# Patient Record
Sex: MALE | Race: WHITE | Employment: UNEMPLOYED | URBAN - METROPOLITAN AREA
[De-identification: names, ages, dates, MRNs, and addresses within clinical notes are randomized per-mention and may not be internally consistent; named-entity substitution may affect disease eponyms.]

---
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
## 2022-01-09 ENCOUNTER — APPOINTMENT (OUTPATIENT)
Dept: GENERAL RADIOLOGY | Age: 22
End: 2022-01-09
Payer: COMMERCIAL

## 2022-01-09 ENCOUNTER — HOSPITAL ENCOUNTER (EMERGENCY)
Age: 22
Discharge: HOME | End: 2022-01-09
Payer: COMMERCIAL

## 2022-01-09 VITALS
HEART RATE: 85 BPM | OXYGEN SATURATION: 98 % | SYSTOLIC BLOOD PRESSURE: 133 MMHG | RESPIRATION RATE: 14 BRPM | DIASTOLIC BLOOD PRESSURE: 76 MMHG | TEMPERATURE: 98.8 F

## 2022-01-09 DIAGNOSIS — Z71.89 EDUCATED ABOUT COVID-19 VIRUS INFECTION: ICD-10-CM

## 2022-01-09 DIAGNOSIS — Z20.822 EXPOSURE TO COVID-19 VIRUS: Primary | ICD-10-CM

## 2022-01-09 DIAGNOSIS — R05.9 COUGH: ICD-10-CM

## 2022-01-09 PROCEDURE — 71101 X-RAY EXAM UNILAT RIBS/CHEST: CPT

## 2022-01-09 PROCEDURE — 99284 EMERGENCY DEPT VISIT MOD MDM: CPT

## 2022-01-09 PROCEDURE — U0005 INFEC AGEN DETEC AMPLI PROBE: HCPCS

## 2022-01-09 PROCEDURE — U0003 INFECTIOUS AGENT DETECTION BY NUCLEIC ACID (DNA OR RNA); SEVERE ACUTE RESPIRATORY SYNDROME CORONAVIRUS 2 (SARS-COV-2) (CORONAVIRUS DISEASE [COVID-19]), AMPLIFIED PROBE TECHNIQUE, MAKING USE OF HIGH THROUGHPUT TECHNOLOGIES AS DESCRIBED BY CMS-2020-01-R: HCPCS

## 2022-01-09 RX ORDER — BENZONATATE 200 MG/1
200 CAPSULE ORAL 3 TIMES DAILY PRN
Qty: 30 CAPSULE | Refills: 0 | Status: SHIPPED | OUTPATIENT
Start: 2022-01-09 | End: 2022-01-19

## 2022-01-09 ASSESSMENT — PAIN DESCRIPTION - PAIN TYPE: TYPE: ACUTE PAIN

## 2022-01-09 ASSESSMENT — PAIN SCALES - GENERAL: PAINLEVEL_OUTOF10: 7

## 2022-01-09 NOTE — ED PROVIDER NOTES
[address] [place] [address]  Department of Emergency Medicine   ED  Encounter Note  Admit Date/RoomTime: 2022  3:26 PM  ED Room:     NAME: Dwayne Kwon  : 2000  MRN: [id number]     Chief Complaint:  Cough (covid sx, right lateral rib pain, pleuritic, exposed to covid at home ) and Rib Pain    History of Present Illness       Dwayne Kwon is a 25 y.o. old male who presents to the emergency department by private vehicle, for cough, which began 2 day(s) prior to arrival.  Since onset the symptoms have been persistent and mild in severity. The symptoms are associated with no additional symptoms as it relates to today's visit. Patient states from coughing so hard his right lateral rib hurts more so with coughing and palpation. Patient denies any falls or trauma. There has been no abdominal pain, decreased appetite, chest tightness, conjunctivitis, watery eyes, productive cough, nausea, vomiting, diarrhea, dizziness, dysuria, urinary frequency, earache, ear pulling, fever, fatigue, headache, hoarseness, irritability, joint swelling, malaise, muscle aches, nasal congestion, runny nose, neck stiffness, rash, sneezing, sore throat, scratchy throat, swollen glands, wheezing, loss of taste or loss of smell. The patient has not received any COVID-19 vaccine    ROS   Pertinent positives and negatives are stated within HPI, all other systems reviewed and are negative. Past Medical History:  has no past medical history on file. Surgical History:  has no past surgical history on file. Social History:      Family History: family history is not on file. Allergies: Patient has no known allergies. Physical Exam   Oxygen Saturation Interpretation: Normal on room air analysis.         ED Triage Vitals [22 1526]   BP Temp Temp Source Pulse Resp SpO2 Height Weight   133/76 99 °F (37.2 °C) Temporal 98 14 98 % -- --         · Constitutional:  Alert, development consistent with age.  · Ears:  External Ears: Right normal.               TM's & External Canals: normal TM's and external ear canals bilaterally. · Nose:   There is no discharge, swelling or lesions noted. · Sinuses: no Bilateral maxillary sinus tenderness. no Bilateral frontal sinus tenderness. · Mouth:  normal tongue and buccal mucosa. · Throat: no erythema or exudates noted. Teeth and gums normal..  Airway Patent. · Neck:  Supple. No meningeal signs. There is no  preauricular, submental, parotid, anterior cervical and posterior cervical node tenderness. · Respiratory:   Breath sounds: Bilateral normal.  Lung sounds: normal.  Chest wall was palpated and reproducible right lateral chest wall pain specifically over ribs 9 and 10 and 11. No evidence of erythema, edema, ecchymosis or trauma  · CV:  Regular rate and rhythm, normal heart sounds, without pathological murmurs, ectopy, gallops, or rubs. · GI:  Abdomen Soft, nontender, good bowel sounds. No firm or pulsatile mass. · Integument:  Normal turgor. Warm, dry, without visible rash. · Neurological:  Oriented. Motor functions intact. Lab / Imaging Results   (All laboratory and radiology results have been personally reviewed by myself)  Labs:  No results found for this visit on 01/09/22. Imaging: All Radiology results interpreted by Radiologist unless otherwise noted. XR RIBS RIGHT INCLUDE CHEST (MIN 3 VIEWS)   Final Result   Addendum 1 of 1   ADDENDUM:   The dictation was inadvertently omitted from the body of the report. No evidence of acute fracture of the ribs. No focal rib abnormality. No evidence of acute focal process in the lungs. No evidence of   consolidation or pulmonary edema. No pleural effusion or pneumothorax. Cardiomediastinal silhouette demonstrates no acute abnormality. Final   No acute abnormality of the ribs. No acute process in the lungs.              ED Course / Medical Decision Making Medications - No data to display    Consults:   None    Procedures:   none    Medical Decision Making:   Patient is a 79-year-old male that is presenting with a cough for the last 2 days and right lateral rib pain. Patient states he was exposed at his home and would like a Covid test.  Patient states from coughing he has had some rib pain on the right side. Patient states reproducible with coughing and palpation. No hemoptysis. Patient denies any chest pain or shortness of breath. Patient has an ambulatory pulse ox which is remained at 98%. Patient denies any acute respiratory distress. Patient had a thorough examination which was found to be negative. Patient had a chest x-ray and no acute lung process was noted. Outpatient Covid swab was done. Patient was told remain on quarantine until the results are given. Tessalon Perles were ordered for the patient due to his cough. Patient was told to drink plenty of warm tea with honey. Patient was told remain on quarantine until his results are given. Patient voiced understanding and is remained vitally stable and is nontoxic appearance and ready for outpatient follow-up. Patient was explicitly instructed on specific signs and symptoms on which to return to the emergency room for. Patient was instructed to return to the ER for any new or worsening symptoms. Additional discharge instructions were given verbally. All questions were answered. Patient is comfortable and agreeable with discharge plan. Patient in no acute distress and non-toxic in appearance. Assessment     1. Exposure to COVID-19 virus    2. Educated about COVID-19 virus infection    3. Cough      Plan   Discharged home.   Patient condition is stable    New Medications     New Prescriptions    BENZONATATE (TESSALON) 200 MG CAPSULE    Take 1 capsule by mouth 3 times daily as needed for Cough     Electronically signed by Radha Escobar PA-C   DD: 1/9/22  **This report was transcribed using voice recognition software. Every effort was made to ensure accuracy; however, inadvertent computerized transcription errors may be present.   END OF ED PROVIDER NOTE          Arnaldo Richardson PA-C  01/09/22 4743

## 2022-01-09 NOTE — Clinical Note
Douglas Potter was seen and treated in our emergency department on 1/9/2022. COVID19 virus is suspected. Per the CDC guidelines we recommend home isolation until the following conditions are all met:    1. At least 10 days have passed since symptoms first appeared and  2. At least 24 hours have passed since last fever without the use of fever-reducing medications and  3. Symptoms (e.g., cough, shortness of breath) have improved    If you have any questions or concerns, please don't hesitate to call.     He may return to work/school on 01/12/2022        [facility]

## 2022-01-10 LAB — SARS-COV-2, PCR: DETECTED

## 2023-09-19 NOTE — ED NOTES
Patients mother Desmond Drake spoke with Mercedes Walsh at [facility], and gave consent for patient to be transferred to their facility, and mom is going to follow ambulance. Patient is currently waiting for Pre authorization from his insurance company for patient to be transferred. Nurse to nurse to be called to [phone]. Speak with the assessment department.      Robert Thompson RN  06/25/19 [address], RN  06/25/19 9352 Silver Nitrate Text: The wound bed was treated with silver nitrate after the biopsy was performed.